# Patient Record
(demographics unavailable — no encounter records)

---

## 2025-03-18 NOTE — DISCUSSION/SUMMARY
[Medication Risks Reviewed] : Medication risks reviewed [Surgical risks reviewed] : Surgical risks reviewed [de-identified] : Patient is a 64-year-old male with severe right hip osteoarthritis presenting today for initial evaluation.  I recommend conservative treatment at this time.  Discussed low-impact activity exercise.  I recommended physical therapy.  I have given a prescription for meloxicam 15 mg daily as needed for pain.  I gave him a prescription for an ultrasound-guided cortisone injection into his right hip.  I will see him back on an as-needed basis.  All questions were asked and answered.  He was advised may be a candidate for total hip arthroplasty in the future

## 2025-03-18 NOTE — HISTORY OF PRESENT ILLNESS
[de-identified] : This is a 64 year old M pt presents today with right hip pain. The pain has been there since the end of 2023 without injury. Pt is ambulating without use of any assistance device. He presents today with chief complaint of intermittent, moderate dull aching pain into the groin.  No radicular pain or paresthesia. Pt has sharp pain but denies numbness or tingling. Reports pain while placing on socks or shoes, squatting, and bending over.  Pain is well controlled and doesn't not impede the patient's daily activities. Patient reports being very active (workouts 5 times a week). No prior treatments of physical therapy noted. No constitutional symptoms noted.  Occasionally takes OTC pain medication for relief.

## 2025-03-18 NOTE — ADDENDUM
[FreeTextEntry1] : This note was written by Aruna Monsalve, acting as the  for Dr. Russell on 03/18/2025. This note accurately reflects the work and decisions made by Dr. Russell.

## 2025-03-18 NOTE — PHYSICAL EXAM
[de-identified] : GENERAL APPEARANCE: Well nourished and hydrated, pleasant, alert, and oriented x 3. Appears their stated age. HEENT: Normocephalic, extraocular eye motion intact. Nasal septum midline. Oral cavity clear. External auditory canal clear. RESPIRATORY: Breath sounds clear and audible in all lobes. No wheezing, No accessory muscle use. CARDIOVASCULAR: No apparent abnormalities. No lower leg edema. No varicosities. Pedal pulses are palpable. NEUROLOGIC: Sensation is normal, no muscle weakness in the upper or lower extremities. DERMATOLOGIC: No apparent skin lesions, moist, warm, no rash. SPINE: Cervical spine appears normal and moves freely; thoracic spine appears normal and moves freely; lumbosacral spine appears normal and moves freely, normal, nontender. MUSCULOSKELETAL: Hands, wrists, and elbows are normal and move freely, shoulders are normal and move freely. PSYCHIATRIC: Oriented to person, place, and time, insight and judgement were intact and the affect was normal. DTRs: Biceps, brachioradialis, triceps, patellar, ankle and plantar 2+ and symmetric bilaterally. Pulses: dorsalis pedis, posterior tibial, femoral, popliteal, and radial 2+ and symmetric bilaterally. Constitutional: Alert and in no acute distress, but well-appearing [de-identified] : right hip exam showed no groin pain with SLR, ROM is full flexion with 60 degrees of external rotation and 20 degrees of internal rotation without pain, TAMELA negative, FADIR positive. No tenderness palpation over the greater trochanter.  5/5 motor strength in bilateral lower extremities. Sensory: Intact in bilateral lower extremities.    [de-identified] : AP pelvis and 2 views of the right hip obtained the office today show no acute fracture or dislocation.  There is severe joint space narrowing bone-on-bone osteoarthritis osteophyte formation consistent with severe right hip osteoarthritis